# Patient Record
Sex: FEMALE | URBAN - METROPOLITAN AREA
[De-identification: names, ages, dates, MRNs, and addresses within clinical notes are randomized per-mention and may not be internally consistent; named-entity substitution may affect disease eponyms.]

---

## 2022-09-23 ENCOUNTER — APPOINTMENT (OUTPATIENT)
Dept: URBAN - METROPOLITAN AREA CLINIC 309 | Age: 27
Setting detail: DERMATOLOGY
End: 2022-09-23

## 2022-09-23 DIAGNOSIS — Z41.9 ENCOUNTER FOR PROCEDURE FOR PURPOSES OTHER THAN REMEDYING HEALTH STATE, UNSPECIFIED: ICD-10-CM

## 2022-09-23 PROCEDURE — OTHER COSMETIC CONSULTATION: CUTERA SECRET PRO: OTHER

## 2022-09-23 ASSESSMENT — LOCATION ZONE DERM: LOCATION ZONE: FACE

## 2022-09-23 ASSESSMENT — LOCATION DETAILED DESCRIPTION DERM: LOCATION DETAILED: LEFT CENTRAL BUCCAL CHEEK

## 2022-09-23 ASSESSMENT — LOCATION SIMPLE DESCRIPTION DERM: LOCATION SIMPLE: LEFT CHEEK

## 2022-09-23 NOTE — HPI: COSMETIC CONSULTATION
Additional History: Patient presents today for cosmetic consult due to scaring on the face from a dog bite on July 4th, 2022. Pt states she is using scar away for scar treatment. Pt would like to discuss options for scar revision.

## 2022-09-23 NOTE — PROCEDURE: COSMETIC CONSULTATION: CUTERA SECRET PRO
Patient Specific Counseling (Will Not Stick From Patient To Patient): - 3 treatments of CO2 laser + radiofrequency microneedling (RFMN) along with 3 treatments of PRP\\n- Discussed Skinceuticals scar gel applied to the scars nightly and massaged x 15 mins\\n- recommended Isdin Eryfotona Actinia sunscreen, and Skinceuticals CE Ferulic
Detail Level: Detailed

## 2022-10-14 ENCOUNTER — APPOINTMENT (OUTPATIENT)
Dept: URBAN - METROPOLITAN AREA CLINIC 309 | Age: 27
Setting detail: DERMATOLOGY
End: 2022-10-14

## 2022-10-14 DIAGNOSIS — Z41.9 ENCOUNTER FOR PROCEDURE FOR PURPOSES OTHER THAN REMEDYING HEALTH STATE, UNSPECIFIED: ICD-10-CM

## 2022-10-14 PROCEDURE — OTHER PRESCRIPTION MEDICATION MANAGEMENT: OTHER

## 2022-10-14 PROCEDURE — OTHER CUTERA SECRET PRO: OTHER

## 2022-10-14 PROCEDURE — OTHER PRESCRIPTION: OTHER

## 2022-10-14 RX ORDER — DOXYCYCLINE 100 MG/1
TABLET, FILM COATED ORAL
Qty: 10 | Refills: 0 | Status: ERX | COMMUNITY
Start: 2022-10-14

## 2022-10-14 RX ORDER — VALACYCLOVIR HYDROCHLORIDE 500 MG/1
TABLET, FILM COATED ORAL
Qty: 5 | Refills: 0 | Status: ERX | COMMUNITY
Start: 2022-10-14

## 2022-10-14 ASSESSMENT — LOCATION ZONE DERM
LOCATION ZONE: FACE
LOCATION ZONE: FACE
LOCATION ZONE: LIP

## 2022-10-14 ASSESSMENT — LOCATION DETAILED DESCRIPTION DERM
LOCATION DETAILED: LEFT MEDIAL BUCCAL CHEEK
LOCATION DETAILED: LEFT LOWER CUTANEOUS LIP
LOCATION DETAILED: LEFT SUPERIOR CENTRAL BUCCAL CHEEK

## 2022-10-14 ASSESSMENT — LOCATION SIMPLE DESCRIPTION DERM
LOCATION SIMPLE: LEFT LIP
LOCATION SIMPLE: LEFT CHEEK
LOCATION SIMPLE: LEFT CHEEK

## 2022-10-14 NOTE — HPI: SCAR
How Severe Is Your Scar?: moderate
Is This A New Presentation, Or A Follow-Up?: Scars
Additional History: Patient had a traumatic dog bite to her face.

## 2022-10-14 NOTE — PROCEDURE: CUTERA SECRET PRO
Intensity (%): 30
Number Of Prepaid Treatments (Will Not Render If 0): 0
Handpiece: CO2 Fractional Handpiece
Skin Type: I
Indication Override: traumatic dog bite scars
Add Another Setting?: no
Shot Count: 365
Handpiece: RF Microneedling Handpiece
Eye Protection: eye shields
Location Override: lower left cheek/chin
Treatment Type: Secret PRO Refine
Treatment Number: 1
Repeat: 3
Anesthesia Volume In Cc: 10
I-Stack: 2
Length Of Topical Anesthesia Application (Optional): 45 minutes
Pre-Procedure Care: Prior to the procedure the patient and all present had protective eyewear in place and a warning sign was placed on the door.
Tip Type: 25 Pin Semi-Insulated Tip
Add Another Setting?: yes
Shape: square
Rf (Ms): 200
Number Of Passes: 4
Topical Anesthesia Type: PLAST (20% benzocaine, 8% lidocaine, 4% tetracaine)
Mode: Single
Procedure Note: Treatment was administered using the setting parameters listed above.
Comments: Added PRP at the end of the RFMN session
Post-Care Instructions: I reviewed with the patient in detail post-care instructions and expectations.  Patient is to practice strict sun avoidance and sun protection.
Scanning: random
Distance (Mm): 1.5
Energy (Mj): 72
Detail Level: Zone
Anesthesia Type: 1% lidocaine with epinephrine
Consent: Prior to the procedure, written consent obtained. Risks reviewed including but not limited to discomfort, pain, redness, swelling, crusting, scabbing, blistering, scarring, darker or lighter pigmentary change, and infection.
Overlap (%): 50
End-Point And Post-Procedure Care: The procedure continued until mild purpura was noted.  Immediately following the procedure, a layer of gauze soaked with isotonic saline solution was applied as a cooling mask for a minimum of 5 minutes.  Post laser topical was applied after saline soak.   Post care reviewed with patient.

## 2022-10-14 NOTE — PROCEDURE: PRESCRIPTION MEDICATION MANAGEMENT
Render In Strict Bullet Format?: No
Detail Level: Zone
Initiate Treatment: Valtrex 500 mg tablet \\nSig: Take one tablet by mouth once daily after a meal with water to completion\\n\\ndoxycycline monohydrate 100 mg tablet \\nSig: Po BID

## 2022-11-18 ENCOUNTER — RX ONLY (RX ONLY)
Age: 27
End: 2022-11-18

## 2022-11-18 RX ORDER — DIAZEPAM 2 MG/1
TABLET ORAL
Qty: 1 | Refills: 0 | COMMUNITY
Start: 2022-11-18

## 2022-12-09 ENCOUNTER — APPOINTMENT (OUTPATIENT)
Dept: URBAN - METROPOLITAN AREA CLINIC 309 | Age: 27
Setting detail: DERMATOLOGY
End: 2022-12-10

## 2022-12-09 DIAGNOSIS — Z41.9 ENCOUNTER FOR PROCEDURE FOR PURPOSES OTHER THAN REMEDYING HEALTH STATE, UNSPECIFIED: ICD-10-CM

## 2022-12-09 PROCEDURE — OTHER PRESCRIPTION MEDICATION MANAGEMENT: OTHER

## 2022-12-09 PROCEDURE — OTHER PRESCRIPTION: OTHER

## 2022-12-09 PROCEDURE — OTHER CUTERA SECRET PRO: OTHER

## 2022-12-09 RX ORDER — VALACYCLOVIR HYDROCHLORIDE 500 MG/1
TABLET, FILM COATED ORAL
Qty: 3 | Refills: 0 | Status: ERX

## 2022-12-09 RX ORDER — DOXYCYCLINE 100 MG/1
TABLET, FILM COATED ORAL
Qty: 6 | Refills: 0 | Status: ERX

## 2022-12-09 ASSESSMENT — LOCATION ZONE DERM
LOCATION ZONE: FACE
LOCATION ZONE: FACE
LOCATION ZONE: LIP

## 2022-12-09 ASSESSMENT — LOCATION SIMPLE DESCRIPTION DERM
LOCATION SIMPLE: LEFT CHEEK
LOCATION SIMPLE: LEFT LIP
LOCATION SIMPLE: LEFT CHEEK

## 2022-12-09 ASSESSMENT — LOCATION DETAILED DESCRIPTION DERM
LOCATION DETAILED: LEFT SUPERIOR CENTRAL BUCCAL CHEEK
LOCATION DETAILED: LEFT MEDIAL BUCCAL CHEEK
LOCATION DETAILED: LEFT LOWER CUTANEOUS LIP

## 2022-12-09 NOTE — HPI: COSMETIC (MICRONEEDLING)
Additional History: Pt presents today for a second session of RFMN on the face. pt was given Diazepam one tablet that she brought in hand for todays procedure. Pt took a half tab 30mg prior then half tab right before. \\nPt is accompanied by her friend who is driving her today (Earnest Callahan) [phone] \\n Additional History: Pt presents today for a second session of RFMN on the face. pt was given Diazepam one tablet that she brought in hand for todays procedure. Pt took a half tab 30mg prior then half tab right before. \\nPt is accompanied by her friend who is driving her today (Earnest Callahan) [phone] \\n

## 2022-12-09 NOTE — PROCEDURE: CUTERA SECRET PRO
Post-Care Instructions: I reviewed with the patient in detail post-care instructions and expectations.  Patient is to practice strict sun avoidance and sun protection.

## 2022-12-09 NOTE — PROCEDURE: CUTERA SECRET PRO
Comments: Added PRP at the end of the RFMN session, and again dripped onto the sites after CO2 laser therapy for laser-assisted drug delivery of the pt's own PRP

## 2022-12-09 NOTE — PROCEDURE: CUTERA SECRET PRO
Consent: Prior to the procedure, written consent obtained. Risks reviewed including but not limited to discomfort, pain, redness, swelling, crusting, scabbing, blistering, scarring, darker or lighter pigmentary change, and infection.

## 2022-12-09 NOTE — PROCEDURE: CUTERA SECRET PRO
End-Point And Post-Procedure Care: The procedure continued until mild purpura was noted.  Immediately following the procedure, a layer of gauze soaked with isotonic saline solution was applied as a cooling mask for a minimum of 5 minutes.  Post laser topical was applied after saline soak.   Post care reviewed with patient.

## 2023-02-09 ENCOUNTER — RX ONLY (RX ONLY)
Age: 28
End: 2023-02-09

## 2023-02-09 RX ORDER — DIAZEPAM 5 MG/1
TABLET ORAL
Qty: 1 | Refills: 0 | Status: CANCELLED

## 2023-02-13 ENCOUNTER — APPOINTMENT (OUTPATIENT)
Dept: URBAN - METROPOLITAN AREA CLINIC 309 | Age: 28
Setting detail: DERMATOLOGY
End: 2023-02-13

## 2023-02-13 DIAGNOSIS — Z41.9 ENCOUNTER FOR PROCEDURE FOR PURPOSES OTHER THAN REMEDYING HEALTH STATE, UNSPECIFIED: ICD-10-CM

## 2023-02-13 PROCEDURE — OTHER PRESCRIPTION MEDICATION MANAGEMENT: OTHER

## 2023-02-13 PROCEDURE — OTHER PRESCRIPTION: OTHER

## 2023-02-13 PROCEDURE — OTHER CUTERA SECRET PRO: OTHER

## 2023-02-13 RX ORDER — VALACYCLOVIR HYDROCHLORIDE 500 MG/1
TABLET, FILM COATED ORAL
Qty: 5 | Refills: 0 | Status: ERX

## 2023-02-13 RX ORDER — DOXYCYCLINE 100 MG/1
TABLET, FILM COATED ORAL
Qty: 20 | Refills: 0 | Status: ERX

## 2023-02-13 ASSESSMENT — LOCATION ZONE DERM
LOCATION ZONE: FACE
LOCATION ZONE: FACE
LOCATION ZONE: LIP

## 2023-02-13 ASSESSMENT — LOCATION SIMPLE DESCRIPTION DERM
LOCATION SIMPLE: LEFT LIP
LOCATION SIMPLE: LEFT CHEEK
LOCATION SIMPLE: LEFT CHEEK

## 2023-02-13 ASSESSMENT — LOCATION DETAILED DESCRIPTION DERM
LOCATION DETAILED: LEFT LOWER CUTANEOUS LIP
LOCATION DETAILED: LEFT SUPERIOR CENTRAL BUCCAL CHEEK
LOCATION DETAILED: LEFT MEDIAL BUCCAL CHEEK

## 2023-02-13 NOTE — PROCEDURE: PRESCRIPTION MEDICATION MANAGEMENT
Initiate Treatment: Valtrex 500mg tablet PO OD x 5 days\\ndoxycycline monohydrate 100mg tablet PO BID x 10 days after a full meal and water
Plan: Pt purchased [employer] CE Ferulic today. Recommended doing more RFMN and CO2 laser resurfacing sessions and the pt states she'll think about it due to the cost burden
Render In Strict Bullet Format?: No
Detail Level: Zone

## 2023-02-24 ENCOUNTER — RX ONLY (RX ONLY)
Age: 28
End: 2023-02-24

## 2023-02-24 RX ORDER — CLINDAMYCIN PHOSPHATE 10 MG/G
GEL TOPICAL
Qty: 60 | Refills: 1 | Status: ERX | COMMUNITY
Start: 2023-02-24

## 2025-05-02 ENCOUNTER — APPOINTMENT (OUTPATIENT)
Dept: URBAN - METROPOLITAN AREA CLINIC 309 | Age: 30
Setting detail: DERMATOLOGY
End: 2025-05-02

## 2025-05-02 DIAGNOSIS — Z41.9 ENCOUNTER FOR PROCEDURE FOR PURPOSES OTHER THAN REMEDYING HEALTH STATE, UNSPECIFIED: ICD-10-CM

## 2025-05-02 PROCEDURE — OTHER PRESCRIPTION: OTHER

## 2025-05-02 PROCEDURE — OTHER CUTERA SECRET PRO: OTHER

## 2025-05-02 PROCEDURE — OTHER PRESCRIPTION MEDICATION MANAGEMENT: OTHER

## 2025-05-02 RX ORDER — VALACYCLOVIR HYDROCHLORIDE 500 MG/1
TABLET, FILM COATED ORAL
Qty: 5 | Refills: 0 | Status: ERX

## 2025-05-02 RX ORDER — DOXYCYCLINE 100 MG/1
TABLET, FILM COATED ORAL
Qty: 20 | Refills: 0 | Status: ERX

## 2025-05-02 ASSESSMENT — LOCATION DETAILED DESCRIPTION DERM
LOCATION DETAILED: LEFT SUPERIOR CENTRAL BUCCAL CHEEK
LOCATION DETAILED: LEFT LOWER CUTANEOUS LIP
LOCATION DETAILED: LEFT MEDIAL BUCCAL CHEEK

## 2025-05-02 ASSESSMENT — LOCATION ZONE DERM
LOCATION ZONE: FACE
LOCATION ZONE: LIP
LOCATION ZONE: FACE

## 2025-05-02 ASSESSMENT — LOCATION SIMPLE DESCRIPTION DERM
LOCATION SIMPLE: LEFT CHEEK
LOCATION SIMPLE: LEFT CHEEK
LOCATION SIMPLE: LEFT LIP